# Patient Record
Sex: FEMALE | Race: WHITE | NOT HISPANIC OR LATINO | URBAN - METROPOLITAN AREA
[De-identification: names, ages, dates, MRNs, and addresses within clinical notes are randomized per-mention and may not be internally consistent; named-entity substitution may affect disease eponyms.]

---
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
## 2024-05-29 NOTE — ED PEDIATRIC NURSE NOTE - NS ED NURSE DC INFO COMPLEXITY
Venipuncture Blood Specimen Collection  Venipuncture performed in left arm by Edel Casarez MA with good hemostasis. Patient tolerated the procedure well without complications.   05/29/24   Edel Casarez MA    Verbalized Understanding